# Patient Record
Sex: FEMALE | Race: WHITE | URBAN - METROPOLITAN AREA
[De-identification: names, ages, dates, MRNs, and addresses within clinical notes are randomized per-mention and may not be internally consistent; named-entity substitution may affect disease eponyms.]

---

## 2017-03-15 ENCOUNTER — TELEPHONE (OUTPATIENT)
Dept: INTERNAL MEDICINE | Facility: CLINIC | Age: 52
End: 2017-03-15

## 2017-10-01 ENCOUNTER — HEALTH MAINTENANCE LETTER (OUTPATIENT)
Age: 52
End: 2017-10-01

## 2017-10-18 ENCOUNTER — OFFICE VISIT (OUTPATIENT)
Dept: FAMILY MEDICINE | Facility: CLINIC | Age: 52
End: 2017-10-18
Payer: COMMERCIAL

## 2017-10-18 VITALS
TEMPERATURE: 98.8 F | HEART RATE: 67 BPM | DIASTOLIC BLOOD PRESSURE: 74 MMHG | OXYGEN SATURATION: 96 % | WEIGHT: 164.8 LBS | BODY MASS INDEX: 26.2 KG/M2 | SYSTOLIC BLOOD PRESSURE: 105 MMHG

## 2017-10-18 DIAGNOSIS — J01.90 ACUTE SINUSITIS WITH SYMPTOMS > 10 DAYS: Primary | ICD-10-CM

## 2017-10-18 DIAGNOSIS — R06.2 WHEEZING: ICD-10-CM

## 2017-10-18 PROCEDURE — 99213 OFFICE O/P EST LOW 20 MIN: CPT | Performed by: PHYSICIAN ASSISTANT

## 2017-10-18 RX ORDER — ALBUTEROL SULFATE 90 UG/1
2 AEROSOL, METERED RESPIRATORY (INHALATION) EVERY 4 HOURS PRN
Qty: 1 INHALER | Refills: 0 | Status: SHIPPED | OUTPATIENT
Start: 2017-10-18 | End: 2017-12-18

## 2017-10-18 NOTE — PROGRESS NOTES
SUBJECTIVE:   Hannah Dawson is a 51 year old female who presents to clinic today for the following health issues:      RESPIRATORY SYMPTOMS      Duration: 1 week    Description  nasal congestion, facial pain/pressure, cough, headache and hoarse voice    Severity: severe    Accompanying signs and symptoms: None    History (predisposing factors):  Had cold 1 month ago which lasted 10-14 days and this one started just as she was starting to get better,.    Precipitating or alleviating factors:     Ibuprofen helps    Therapies tried and outcome:  rest and fluids oral decongestant (mucinex D) advil, sinus rinse,     Works in [employer]    Does get wheezing with URI's sometimes but no formal asthma dx.              Allergies   Allergen Reactions     Erythromycin GI Disturbance       Past Medical History:   Diagnosis Date     Actinic keratosis      Menometrorrhagia      Other disorder of menstruation and other abnormal bleeding from female genital tract      Symptomatic menopausal or female climacteric states          Current Outpatient Prescriptions on File Prior to Visit:  DiphenhydrAMINE HCl (BENADRYL PO)    hydrocortisone (WESTCORT) 0.2 % cream Apply sparingly to affected area two times daily as needed.   albuterol (PROAIR HFA, PROVENTIL HFA, VENTOLIN HFA) 108 (90 BASE) MCG/ACT inhaler Inhale 2 puffs into the lungs every 6 hours as needed for shortness of breath / dyspnea or wheezing   NO ACTIVE MEDICATIONS      No current facility-administered medications on file prior to visit.     Social History   Substance Use Topics     Smoking status: Never Smoker     Smokeless tobacco: Never Used     Alcohol use Yes      Comment: occ       ROS:  Consitutional: As above  ENT: As above  Respiratory: As above    OBJECTIVE:  /74 (BP Location: Left arm, Cuff Size: Adult Regular)  Pulse 67  Temp 98.8  F (37.1  C)  Wt 164 lb 12.8 oz (74.8 kg)  LMP 12/14/2015  SpO2 96%  BMI 26.2 kg/m2  GENERAL APPEARANCE: healthy,  alert and no distress  EYES: conjunctiva clear  HENT:   TMs w/o erythema, effusion or bulging.  Nose and mouth without ulcers, erythema or lesions.  NO tonsillar enlargement erythema or exudates.   NECK: supple, nontender, no lymphadenopathy  RESP: lungs clear to auscultation - no rales, rhonchi or wheezes  CV: regular rates and rhythm, normal S1 S2, no murmur noted  NEURO: awake, alert          ASSESSMENT: Well appearing.    ICD-10-CM    1. Acute sinusitis with symptoms > 10 days J01.90 amoxicillin-clavulanate (AUGMENTIN) 875-125 MG per tablet   2. Wheezing R06.2 albuterol (PROAIR HFA/PROVENTIL HFA/VENTOLIN HFA) 108 (90 BASE) MCG/ACT Inhaler         PLAN:  Lots of rest and fluids.  RTC if any worsening symptoms or if not improving.    Zach Ch PA-C

## 2017-10-18 NOTE — MR AVS SNAPSHOT
After Visit Summary   10/18/2017    Hannah Dawson    MRN: [id number]           Patient Information     Date Of Birth          1965        Visit Information        Provider Department      10/18/2017 9:40 AM Huy Ch PA [facility]        Today's Diagnoses     Acute sinusitis with symptoms > 10 days    -  1    Wheezing          Care Instructions      Sinusitis [Abx Tx]    The sinuses are air-filled spaces within the bones of the face. They connect to the inside of the nose. Sinusitis is an inflammation of the tissue lining the sinus cavity. Sinus inflammation can occur during a cold or hay-fever (allergies to pollens and other particles in the air) and cause symptoms of sinus congestion and fullness. A sinus infection causes fever, headache and facial pain. There is usually green or yellow drainage from the nose or into the back of the throat (post-nasal drip). Antibiotics are prescribed to treat this condition.  Home Care:  Drink plenty of water, hot tea, and other liquids to stay well hydrated. This thins the mucus and promotes sinus drainage.  Apply heat to the painful areas of the face. Use a towel soaked in hot water. Or,  the shower and direct the hot spray onto your face. This is a good way to inhale warm water vapor and get heat on your face at the same time. (Cover your mouth and nose with your hands so you can still breathe as you do this.)  Use a vaporizer with products such as Vicks VapoRub (contains menthol) at night. Suck on peppermint, menthol or eucalyptus hard candies during the day.  An expectorant containing guaifenesin (such as Robitussin), helps to thin the mucus and promote drainage from the sinuses.  Over-the-counter decongestants may be used unless a similar medicine was prescribed. Nasal sprays work the fastest. Use one that contains phenylephrine (Cullen-synephrine, Sinex and others) or oxymetazoline (Afrin). First blow the  nose gently to remove mucus, then apply the drops. Do not use these medicines more often than directed on the label or for more than three days or symptoms may worsen. You may also use tablets containing pseudoephedrine (Sudafed). Many sinus remedies combine ingredients, which may increase side effects. Read the labels or ask the pharmacist for help. NOTE: Persons with high blood pressure should not use decongestants. They can raise blood pressure.  Antihistamines are useful if allergies are a cause of your sinusitis. The mildest one is chlorpheniramine (available without a prescription). The dose for adults is 8-12mg three times a day. [NOTE: Do not use chlorpheniramine if you have glaucoma or if you are a man with trouble urinating due to an enlarged prostate.] Claritin (loratidine) is an antihistamine that causes less drowsiness and is a good alternative for daytime use.  Do not use nasal rinses or irrigation during an acute sinus infection, unless advised by your doctor. Rinsing may spread the infection to other sinuses.  You may use acetaminophen (Tylenol) or ibuprofen (Motrin, Advil) to control pain, unless another pain medicine was prescribed. [ NOTE: If you have chronic liver or kidney disease or ever had a stomach ulcer, talk with your doctor before using these medicines.] (Aspirin should never be used in anyone under 18 years of age who is ill with a fever. It may cause severe liver damage.)  Finish the full course, even if you are feeling better after a few days.  Follow Up  with your doctor or this facility in one week or as instructed by our staff if not improving.  Get Prompt Medical Attention  if any of the following occur:  Facial pain or headache becomes more severe  Stiff neck  Unusual drowsiness or confusion, or not acting like your normal self  Swelling of the forehead or eyelids  Vision problems including blurred or double vision  Fever of 100.4 F (38 C) or higher, or as directed by your  healthcare provider  Seizure    [id number] [employer], [address] [place] [address], [place] [address]. All rights reserved. This information is not intended as a substitute for professional medical care. Always follow your healthcare professional's instructions.                Follow-ups after your visit        Who to contact     If you have questions or need follow up information about today's clinic visit or your schedule please contact [facility] directly at [phone].  Normal or non-critical lab and imaging results will be communicated to you by [employer], letter or phone within 4 business days after the clinic has received the results. If you do not hear from us within 7 days, please contact the clinic through [employer] or phone. If you have a critical or abnormal lab result, we will notify you by phone as soon as possible.  Submit refill requests through [employer] or call your pharmacy and they will forward the refill request to us. Please allow 3 business days for your refill to be completed.          Additional Information About Your Visit        [employer] Information     [employer] gives you secure access to your electronic health record. If you see a primary care provider, you can also send messages to your care team and make appointments. If you have questions, please call your primary care clinic.  If you do not have a primary care provider, please call [phone] and they will assist you.        Care EveryWhere ID     This is your Care EveryWhere ID. This could be used by other organizations to access your [facility] medical records  [id number]        Your Vitals Were     Pulse Temperature Last Period Pulse Oximetry BMI (Body Mass Index)       67 98.8  F (37.1  C) 12/14/2015 96% 26.2 kg/m2        Blood Pressure from Last 3 Encounters:   10/18/17 105/74   11/07/16 108/82   06/13/16 100/66    Weight from Last 3 Encounters:   10/18/17 164 lb 12.8 oz (74.8 kg)   11/07/16 161 lb (73 kg)    06/06/16 156 lb (70.8 kg)              Today, you had the following     No orders found for display         Today's Medication Changes          These changes are accurate as of: 10/18/17  9:49 AM.  If you have any questions, ask your nurse or doctor.               Start taking these medicines.        Dose/Directions    amoxicillin-clavulanate 875-125 MG per tablet   Commonly known as:  AUGMENTIN   Used for:  Acute sinusitis with symptoms > 10 days   Started by:  Huy Ch PA        Dose:  1 tablet   Take 1 tablet by mouth 2 times daily   Quantity:  20 tablet   Refills:  0         These medicines have changed or have updated prescriptions.        Dose/Directions    * albuterol 108 (90 BASE) MCG/ACT Inhaler   Commonly known as:  PROAIR HFA/PROVENTIL HFA/VENTOLIN HFA   This may have changed:  Another medication with the same name was added. Make sure you understand how and when to take each.   Used for:  Bronchospasm   Changed by:  Swathi Christopher APRN CNP        Dose:  2 puff   Inhale 2 puffs into the lungs every 6 hours as needed for shortness of breath / dyspnea or wheezing   Quantity:  1 Inhaler   Refills:  1       * albuterol 108 (90 BASE) MCG/ACT Inhaler   Commonly known as:  PROAIR HFA/PROVENTIL HFA/VENTOLIN HFA   This may have changed:  You were already taking a medication with the same name, and this prescription was added. Make sure you understand how and when to take each.   Used for:  Wheezing   Changed by:  Huy Ch PA        Dose:  2 puff   Inhale 2 puffs into the lungs every 4 hours as needed for shortness of breath / dyspnea or wheezing   Quantity:  1 Inhaler   Refills:  0       * Notice:  This list has 2 medication(s) that are the same as other medications prescribed for you. Read the directions carefully, and ask your doctor or other care provider to review them with you.         Where to get your medicines      These medications were sent to [facility] - [place], [place] - [address]  [address], [facility] [address]     Phone:  [phone]     albuterol 108 (90 BASE) MCG/ACT Inhaler    amoxicillin-clavulanate 875-125 MG per tablet                Primary Care Provider Office Phone # Fax #    [facility] [phone] [address] [facility] [address]        Equal Access to Services     ADA BELLA : Hadii aad ku hadasho Soomaali, waaxda luqadaha, qaybta kaalmada adeegyada, waxay idiin hayaan adeeg kharash lacolleen . [facility] [phone].    ATENCIÓN: Si habla español, tiene a ludwig disposición servicios gratuitos de asistencia lingüística. [facility] [phone].    We comply with applicable federal civil rights laws and [place] laws. We do not discriminate on the basis of race, color, national origin, age, disability, sex, sexual orientation, or gender identity.            Thank you!     Thank you for choosing [facility]  for your care. Our goal is always to provide you with excellent care. Hearing back from our patients is one way we can continue to improve our services. Please take a few minutes to complete the written survey that you may receive in the mail after your visit with us. Thank you!             Your Updated Medication List - Protect others around you: Learn how to safely use, store and throw away your medicines at www.disposemymeds.org.          This list is accurate as of: 10/18/17  9:49 AM.  Always use your most recent med list.                   Brand Name Dispense Instructions for use Diagnosis    ADVIL PO      Take 200 mg by mouth        * albuterol 108 (90 BASE) MCG/ACT Inhaler    PROAIR HFA/PROVENTIL HFA/VENTOLIN HFA    1 Inhaler    Inhale 2 puffs into the lungs every 6 hours as needed for shortness of breath / dyspnea or wheezing    Bronchospasm       * albuterol 108 (90 BASE) MCG/ACT Inhaler    PROAIR HFA/PROVENTIL HFA/VENTOLIN HFA    1 Inhaler     Inhale 2 puffs into the lungs every 4 hours as needed for shortness of breath / dyspnea or wheezing    Wheezing       amoxicillin-clavulanate 875-125 MG per tablet    AUGMENTIN    20 tablet    Take 1 tablet by mouth 2 times daily    Acute sinusitis with symptoms > 10 days       BENADRYL PO           hydrocortisone 0.2 % cream    WESTCORT    60 g    Apply sparingly to affected area two times daily as needed.    Eczema       NO ACTIVE MEDICATIONS           * Notice:  This list has 2 medication(s) that are the same as other medications prescribed for you. Read the directions carefully, and ask your doctor or other care provider to review them with you.

## 2017-10-18 NOTE — PATIENT INSTRUCTIONS
Sinusitis [Abx Tx]    The sinuses are air-filled spaces within the bones of the face. They connect to the inside of the nose. Sinusitis is an inflammation of the tissue lining the sinus cavity. Sinus inflammation can occur during a cold or hay-fever (allergies to pollens and other particles in the air) and cause symptoms of sinus congestion and fullness. A sinus infection causes fever, headache and facial pain. There is usually green or yellow drainage from the nose or into the back of the throat (post-nasal drip). Antibiotics are prescribed to treat this condition.  Home Care:  Drink plenty of water, hot tea, and other liquids to stay well hydrated. This thins the mucus and promotes sinus drainage.  Apply heat to the painful areas of the face. Use a towel soaked in hot water. Or,  the shower and direct the hot spray onto your face. This is a good way to inhale warm water vapor and get heat on your face at the same time. (Cover your mouth and nose with your hands so you can still breathe as you do this.)  Use a vaporizer with products such as Vicks VapoRub (contains menthol) at night. Suck on peppermint, menthol or eucalyptus hard candies during the day.  An expectorant containing guaifenesin (such as Robitussin), helps to thin the mucus and promote drainage from the sinuses.  Over-the-counter decongestants may be used unless a similar medicine was prescribed. Nasal sprays work the fastest. Use one that contains phenylephrine (Cullen-synephrine, Sinex and others) or oxymetazoline (Afrin). First blow the nose gently to remove mucus, then apply the drops. Do not use these medicines more often than directed on the label or for more than three days or symptoms may worsen. You may also use tablets containing pseudoephedrine (Sudafed). Many sinus remedies combine ingredients, which may increase side effects. Read the labels or ask the pharmacist for help. NOTE: Persons with high blood pressure should not use  decongestants. They can raise blood pressure.  Antihistamines are useful if allergies are a cause of your sinusitis. The mildest one is chlorpheniramine (available without a prescription). The dose for adults is 8-12mg three times a day. [NOTE: Do not use chlorpheniramine if you have glaucoma or if you are a man with trouble urinating due to an enlarged prostate.] Claritin (loratidine) is an antihistamine that causes less drowsiness and is a good alternative for daytime use.  Do not use nasal rinses or irrigation during an acute sinus infection, unless advised by your doctor. Rinsing may spread the infection to other sinuses.  You may use acetaminophen (Tylenol) or ibuprofen (Motrin, Advil) to control pain, unless another pain medicine was prescribed. [ NOTE: If you have chronic liver or kidney disease or ever had a stomach ulcer, talk with your doctor before using these medicines.] (Aspirin should never be used in anyone under 18 years of age who is ill with a fever. It may cause severe liver damage.)  Finish the full course, even if you are feeling better after a few days.  Follow Up  with your doctor or this facility in one week or as instructed by our staff if not improving.  Get Prompt Medical Attention  if any of the following occur:  Facial pain or headache becomes more severe  Stiff neck  Unusual drowsiness or confusion, or not acting like your normal self  Swelling of the forehead or eyelids  Vision problems including blurred or double vision  Fever of 100.4 F (38 C) or higher, or as directed by your healthcare provider  Seizure    [id number] [facility], [address] [place] [address], [place] [address]. All rights reserved. This information is not intended as a substitute for professional medical care. Always follow your healthcare professional's instructions.

## 2017-10-18 NOTE — Clinical Note
Please abstract the following data from this visit with this patient into the appropriate field in Epic:  Pap smear done on this date: 10/2016 (approximately), by this group: [facility], results were normal.

## 2017-10-18 NOTE — NURSING NOTE
"Chief Complaint   Patient presents with     URI       Initial /74 (BP Location: Left arm, Cuff Size: Adult Regular)  Pulse 67  Temp 98.8  F (37.1  C)  Wt 164 lb 12.8 oz (74.8 kg)  LMP 12/14/2015  SpO2 96%  BMI 26.2 kg/m2 Estimated body mass index is 26.2 kg/(m^2) as calculated from the following:    Height as of 11/7/16: 5' 6.5\" (1.689 m).    Weight as of this encounter: 164 lb 12.8 oz (74.8 kg).  Medication Reconciliation: complete   AZAM Mckinley 10/18/2017         "

## 2017-12-18 ENCOUNTER — OFFICE VISIT (OUTPATIENT)
Dept: URGENT CARE | Facility: URGENT CARE | Age: 52
End: 2017-12-18
Payer: COMMERCIAL

## 2017-12-18 VITALS
TEMPERATURE: 97.7 F | BODY MASS INDEX: 25.91 KG/M2 | WEIGHT: 163 LBS | DIASTOLIC BLOOD PRESSURE: 78 MMHG | HEART RATE: 70 BPM | SYSTOLIC BLOOD PRESSURE: 123 MMHG | OXYGEN SATURATION: 97 %

## 2017-12-18 DIAGNOSIS — J02.9 SORETHROAT: Primary | ICD-10-CM

## 2017-12-18 DIAGNOSIS — J02.0 STREP THROAT: ICD-10-CM

## 2017-12-18 LAB
DEPRECATED S PYO AG THROAT QL EIA: ABNORMAL
SPECIMEN SOURCE: ABNORMAL

## 2017-12-18 PROCEDURE — 99213 OFFICE O/P EST LOW 20 MIN: CPT | Performed by: PHYSICIAN ASSISTANT

## 2017-12-18 PROCEDURE — 87880 STREP A ASSAY W/OPTIC: CPT | Performed by: PHYSICIAN ASSISTANT

## 2017-12-18 RX ORDER — AMOXICILLIN 500 MG/1
500 TABLET, FILM COATED ORAL 2 TIMES DAILY
Qty: 20 TABLET | Refills: 0 | Status: SHIPPED | OUTPATIENT
Start: 2017-12-18 | End: 2018-06-14

## 2017-12-18 ASSESSMENT — ENCOUNTER SYMPTOMS
CHILLS: 0
SORE THROAT: 1
VOMITING: 0
NAUSEA: 0
COUGH: 0
FEVER: 0

## 2017-12-18 NOTE — MR AVS SNAPSHOT
After Visit Summary   12/18/2017    Hannah Dawson    MRN: [id number]           Patient Information     Date Of Birth          1965        Visit Information        Provider Department      12/18/2017 5:45 PM Ninfa Fry PA-C [facility]        Today's Diagnoses     Sorethroat    -  1    Strep throat          Care Instructions      Pharyngitis: Strep (Confirmed)    You have had a positive test for strep throat. Strep throat is a contagious illness. It is spread by coughing, kissing or by touching others after touching your mouth or nose. Symptoms include throat pain that is worse with swallowing, aching all over, headache, and fever. It is treated with antibiotic medicine. This should help you start to feel better in 1 to 2 days.  Home care    Rest at home. Drink plenty of fluids to you won't get dehydrated.    No work or school for the first 2 days of taking the antibiotics. After this time, you will not be contagious. You can then return to school or work if you are feeling better.     Take antibiotic medicine for the full 10 days, even if you feel better. This is very important to ensure the infection is treated. It is also important to prevent medicine-resistant germs from developing. If you were given an antibiotic shot, you don't need any more antibiotics.    You may use acetaminophen or ibuprofen to control pain or fever, unless another medicine was prescribed for this. Talk with your doctor before taking these medicines if you have chronic liver or kidney disease. Also talk with your doctor if you have had a stomach ulcer or GI bleeding.    Throat lozenges or sprays help reduce pain. Gargling with warm saltwater will also reduce throat pain. Dissolve 1/2 teaspoon of salt in 1 glass of warm water. This may be useful just before meals.     Soft foods are OK. Avoid salty or spicy foods.  Follow-up care  Follow up with your healthcare provider or our staff if you  don't get better over the next week.  When to seek medical advice  Call your healthcare provider right away if any of these occur:    Fever of 100.4 F (38 C) or higher, or as directed by your healthcare provider    New or worsening ear pain, sinus pain, or headache    Painful lumps in the back of neck    Stiff neck    Lymph nodes getting larger or becoming soft in the middle    You can't swallow liquids or you can't open your mouth wide because of throat pain    Signs of dehydration. These include very dark urine or no urine, sunken eyes, and dizziness.    Trouble breathing or noisy breathing    Muffled voice    Rash  Date Last Reviewed: 4/13/2015 2000-2017 [employer]. [address] [place] [address]. All rights reserved. This information is not intended as a substitute for professional medical care. Always follow your healthcare professional's instructions.                Follow-ups after your visit        Your next 10 appointments already scheduled     Dec 19, 2017  2:00 PM CST   SHORT with Janette Paulino MD   [facility] ([facility]    [address] [facility] [address] [phone]              Who to contact     If you have questions or need follow up information about today's clinic visit or your schedule please contact [facility] directly at [phone].  Normal or non-critical lab and imaging results will be communicated to you by MyChart, letter or phone within 4 business days after the clinic has received the results. If you do not hear from us within 7 days, please contact the clinic through MyChart or phone. If you have a critical or abnormal lab result, we will notify you by phone as soon as possible.  Submit refill requests through [employer] or call your pharmacy and they will forward the refill request to us. Please allow 3 business days for your refill to be completed.          Additional Information About Your Visit         Ludi Information     Ludi gives you secure access to your electronic health record. If you see a primary care provider, you can also send messages to your care team and make appointments. If you have questions, please call your primary care clinic.  If you do not have a primary care provider, please call [phone] and they will assist you.        Care EveryWhere ID     This is your Care EveryWhere ID. This could be used by other organizations to access your [place] medical records  [id number]        Your Vitals Were     Pulse Temperature Last Period Pulse Oximetry BMI (Body Mass Index)       70 97.7  F (36.5  C) (Oral) 12/14/2015 97% 25.91 kg/m2        Blood Pressure from Last 3 Encounters:   12/18/17 123/78   10/18/17 105/74   11/07/16 108/82    Weight from Last 3 Encounters:   12/18/17 163 lb (73.9 kg)   10/18/17 164 lb 12.8 oz (74.8 kg)   11/07/16 161 lb (73 kg)              We Performed the Following     Strep, Rapid Screen          Today's Medication Changes          These changes are accurate as of: 12/18/17  7:44 PM.  If you have any questions, ask your nurse or doctor.               Start taking these medicines.        Dose/Directions    amoxicillin 500 MG tablet   Commonly known as:  AMOXIL   Used for:  Strep throat        Dose:  500 mg   Take 1 tablet (500 mg) by mouth 2 times daily   Quantity:  20 tablet   Refills:  0         Stop taking these medicines if you haven't already. Please contact your care team if you have questions.     albuterol 108 (90 BASE) MCG/ACT Inhaler   Commonly known as:  PROAIR HFA/PROVENTIL HFA/VENTOLIN HFA           amoxicillin-clavulanate 875-125 MG per tablet   Commonly known as:  AUGMENTIN           hydrocortisone 0.2 % cream   Commonly known as:  WESTCORT                Where to get your medicines      These medications were sent to [facility] [id number] [place] [address] [address] [facility] [address] [facility]  [address] [facility],  [facility] [address]     Phone:  [phone]     amoxicillin 500 MG tablet                Primary Care Provider Office Phone # Fax #    [facility] [phone] [address] [facility] [address]        Equal Access to Services     ADA BELLA : Hadii aad ku hadasho Soomaali, waaxda luqadaha, qaybta kaalmada adeegyada, waxay brittnyin hayaan [place] velazquez lacolleen dsouza. So [facility] [phone].    ATENCIÓN: Si habla español, tiene a ludwig disposición servicios gratuitos de asistencia lingüística. Llame al [phone].    We comply with applicable federal civil rights laws and [place] laws. We do not discriminate on the basis of race, color, national origin, age, disability, sex, sexual orientation, or gender identity.            Thank you!     Thank you for choosing [facility]  for your care. Our goal is always to provide you with excellent care. Hearing back from our patients is one way we can continue to improve our services. Please take a few minutes to complete the written survey that you may receive in the mail after your visit with us. Thank you!             Your Updated Medication List - Protect others around you: Learn how to safely use, store and throw away your medicines at www.disposemymeds.org.          This list is accurate as of: 12/18/17  7:44 PM.  Always use your most recent med list.                   Brand Name Dispense Instructions for use Diagnosis    ADVIL PO      Take 200 mg by mouth        amoxicillin 500 MG tablet    AMOXIL    20 tablet    Take 1 tablet (500 mg) by mouth 2 times daily    Strep throat       BENADRYL PO           NO ACTIVE MEDICATIONS

## 2017-12-19 NOTE — PROGRESS NOTES
SUBJECTIVE:   Hannah Dawson is a 52 year old female presenting with a chief complaint of   Chief Complaint   Patient presents with     Pharyngitis     would like to check for strep   .  Sore throat x 4-5 days. No cough or other cold symptoms. NO fevers.   Works at an elementary school.     Review of Systems   Constitutional: Negative for chills and fever.   HENT: Positive for sore throat. Negative for ear pain.    Respiratory: Negative for cough.    Gastrointestinal: Negative for nausea and vomiting.   Skin: Negative for rash.     Past Medical History:   Diagnosis Date     Actinic keratosis      Menometrorrhagia      Other disorder of menstruation and other abnormal bleeding from female genital tract      Symptomatic menopausal or female climacteric states      Current Outpatient Prescriptions   Medication Sig Dispense Refill     Ibuprofen (ADVIL PO) Take 200 mg by mouth       DiphenhydrAMINE HCl (BENADRYL PO)        NO ACTIVE MEDICATIONS        Social History   Substance Use Topics     Smoking status: Never Smoker     Smokeless tobacco: Never Used     Alcohol use Yes      Comment: occ       OBJECTIVE  /78  Pulse 70  Temp 97.7  F (36.5  C) (Oral)  Wt 163 lb (73.9 kg)  LMP 12/14/2015  SpO2 97%  BMI 25.91 kg/m2    Physical Exam    Labs:  No results found for this or any previous visit (from the past 24 hour(s)).        ASSESSMENT:      ICD-10-CM    1. Sorethroat J02.9 Strep, Rapid Screen        Medical Decision Making:    Differential Diagnosis:  Strep throat    Serious Comorbid Conditions:  Adult:  None    PLAN:    + strep throat    Current Outpatient Prescriptions   Medication     amoxicillin (AMOXIL) 500 MG tablet     Ibuprofen (ADVIL PO)     DiphenhydrAMINE HCl (BENADRYL PO)     NO ACTIVE MEDICATIONS     No current facility-administered medications for this visit.      Amoxicillin 500mg BID for 10 days    Followup:    If not improving or if condition worsens, follow up with your Primary Care  Provider    Patient Instructions     Pharyngitis: Strep (Confirmed)    You have had a positive test for strep throat. Strep throat is a contagious illness. It is spread by coughing, kissing or by touching others after touching your mouth or nose. Symptoms include throat pain that is worse with swallowing, aching all over, headache, and fever. It is treated with antibiotic medicine. This should help you start to feel better in 1 to 2 days.  Home care    Rest at home. Drink plenty of fluids to you won't get dehydrated.    No work or school for the first 2 days of taking the antibiotics. After this time, you will not be contagious. You can then return to school or work if you are feeling better.     Take antibiotic medicine for the full 10 days, even if you feel better. This is very important to ensure the infection is treated. It is also important to prevent medicine-resistant germs from developing. If you were given an antibiotic shot, you don't need any more antibiotics.    You may use acetaminophen or ibuprofen to control pain or fever, unless another medicine was prescribed for this. Talk with your doctor before taking these medicines if you have chronic liver or kidney disease. Also talk with your doctor if you have had a stomach ulcer or GI bleeding.    Throat lozenges or sprays help reduce pain. Gargling with warm saltwater will also reduce throat pain. Dissolve 1/2 teaspoon of salt in 1 glass of warm water. This may be useful just before meals.     Soft foods are OK. Avoid salty or spicy foods.  Follow-up care  Follow up with your healthcare provider or our staff if you don't get better over the next week.  When to seek medical advice  Call your healthcare provider right away if any of these occur:    Fever of 100.4 F (38 C) or higher, or as directed by your healthcare provider    New or worsening ear pain, sinus pain, or headache    Painful lumps in the back of neck    Stiff neck    Lymph nodes getting larger  or becoming soft in the middle    You can't swallow liquids or you can't open your mouth wide because of throat pain    Signs of dehydration. These include very dark urine or no urine, sunken eyes, and dizziness.    Trouble breathing or noisy breathing    Muffled voice    Rash  Date Last Reviewed: 4/13/2015 2000-2017 [employer]. [address] [place] [address], [place] [address]. All rights reserved. This information is not intended as a substitute for professional medical care. Always follow your healthcare professional's instructions.

## 2017-12-19 NOTE — PATIENT INSTRUCTIONS
Pharyngitis: Strep (Confirmed)    You have had a positive test for strep throat. Strep throat is a contagious illness. It is spread by coughing, kissing or by touching others after touching your mouth or nose. Symptoms include throat pain that is worse with swallowing, aching all over, headache, and fever. It is treated with antibiotic medicine. This should help you start to feel better in 1 to 2 days.  Home care    Rest at home. Drink plenty of fluids to you won't get dehydrated.    No work or school for the first 2 days of taking the antibiotics. After this time, you will not be contagious. You can then return to school or work if you are feeling better.     Take antibiotic medicine for the full 10 days, even if you feel better. This is very important to ensure the infection is treated. It is also important to prevent medicine-resistant germs from developing. If you were given an antibiotic shot, you don't need any more antibiotics.    You may use acetaminophen or ibuprofen to control pain or fever, unless another medicine was prescribed for this. Talk with your doctor before taking these medicines if you have chronic liver or kidney disease. Also talk with your doctor if you have had a stomach ulcer or GI bleeding.    Throat lozenges or sprays help reduce pain. Gargling with warm saltwater will also reduce throat pain. Dissolve 1/2 teaspoon of salt in 1 glass of warm water. This may be useful just before meals.     Soft foods are OK. Avoid salty or spicy foods.  Follow-up care  Follow up with your healthcare provider or our staff if you don't get better over the next week.  When to seek medical advice  Call your healthcare provider right away if any of these occur:    Fever of 100.4 F (38 C) or higher, or as directed by your healthcare provider    New or worsening ear pain, sinus pain, or headache    Painful lumps in the back of neck    Stiff neck    Lymph nodes getting larger or becoming soft in the  middle    You can't swallow liquids or you can't open your mouth wide because of throat pain    Signs of dehydration. These include very dark urine or no urine, sunken eyes, and dizziness.    Trouble breathing or noisy breathing    Muffled voice    Rash  Date Last Reviewed: 4/13/2015 2000-2017 [employer]. [address] [place] [address], [place] [address]. All rights reserved. This information is not intended as a substitute for professional medical care. Always follow your healthcare professional's instructions.

## 2017-12-19 NOTE — NURSING NOTE
"Chief Complaint   Patient presents with     Pharyngitis     would like to check for strep       Initial /78  Pulse 70  Temp 97.7  F (36.5  C) (Oral)  Wt 163 lb (73.9 kg)  LMP 12/14/2015  SpO2 97%  BMI 25.91 kg/m2 Estimated body mass index is 25.91 kg/(m^2) as calculated from the following:    Height as of 11/7/16: 5' 6.5\" (1.689 m).    Weight as of this encounter: 163 lb (73.9 kg).  Medication Reconciliation: complete     VLADIMIR CABRERA      "

## 2018-06-14 ENCOUNTER — OFFICE VISIT (OUTPATIENT)
Dept: FAMILY MEDICINE | Facility: CLINIC | Age: 53
End: 2018-06-14
Payer: COMMERCIAL

## 2018-06-14 VITALS
HEART RATE: 71 BPM | DIASTOLIC BLOOD PRESSURE: 78 MMHG | RESPIRATION RATE: 18 BRPM | BODY MASS INDEX: 26.23 KG/M2 | OXYGEN SATURATION: 98 % | WEIGHT: 165 LBS | TEMPERATURE: 97.7 F | SYSTOLIC BLOOD PRESSURE: 117 MMHG

## 2018-06-14 DIAGNOSIS — R07.0 THROAT PAIN: ICD-10-CM

## 2018-06-14 DIAGNOSIS — J02.9 VIRAL PHARYNGITIS: Primary | ICD-10-CM

## 2018-06-14 LAB
DEPRECATED S PYO AG THROAT QL EIA: NORMAL
SPECIMEN SOURCE: NORMAL

## 2018-06-14 PROCEDURE — 99213 OFFICE O/P EST LOW 20 MIN: CPT | Performed by: FAMILY MEDICINE

## 2018-06-14 PROCEDURE — 87880 STREP A ASSAY W/OPTIC: CPT | Performed by: FAMILY MEDICINE

## 2018-06-14 PROCEDURE — 87081 CULTURE SCREEN ONLY: CPT | Performed by: FAMILY MEDICINE

## 2018-06-14 ASSESSMENT — PAIN SCALES - GENERAL: PAINLEVEL: NO PAIN (0)

## 2018-06-14 NOTE — NURSING NOTE
"Chief Complaint   Patient presents with     Pharyngitis     possible strep x 3 days       Initial /78  Pulse 71  Temp 97.7  F (36.5  C) (Oral)  Resp 18  Wt 165 lb (74.8 kg)  LMP 12/14/2015  SpO2 98%  BMI 26.23 kg/m2 Estimated body mass index is 26.23 kg/(m^2) as calculated from the following:    Height as of 11/7/16: 5' 6.5\" (1.689 m).    Weight as of this encounter: 165 lb (74.8 kg).  Medication Reconciliation: complete    ANITA Rogers MA    "

## 2018-06-14 NOTE — PROGRESS NOTES
CHIEF COMPLAINT    Sore throat      HISTORY    ST for 2-3 days. Slight malaise. No congestion.    Patient Active Problem List   Diagnosis     CARDIOVASCULAR SCREENING; LDL GOAL LESS THAN 160     Current Outpatient Prescriptions   Medication Sig Dispense Refill     DiphenhydrAMINE HCl (BENADRYL PO)        Ibuprofen (ADVIL PO) Take 200 mg by mouth         REVIEW OF SYSTEMS    No cough or SOB  No CP  No GI upset  No rash      Past Medical History:   Diagnosis Date     Actinic keratosis      Menometrorrhagia      Other disorder of menstruation and other abnormal bleeding from female genital tract      Symptomatic menopausal or female climacteric states        EXAM  /78  Pulse 71  Temp 97.7  F (36.5  C) (Oral)  Resp 18  Wt 165 lb (74.8 kg)  LMP 12/14/2015  SpO2 98%  BMI 26.23 kg/m2    Pharynx mildly red with small tonsils, no exudate  Mildly enlarged ant cervical nodes  Skin no rash    Results for orders placed or performed in visit on 06/14/18   Strep, Rapid Screen   Result Value Ref Range    Specimen Description Throat     Rapid Strep A Screen       NEGATIVE: No Group A streptococcal antigen detected by immunoassay, await culture report.       (J02.9) Viral pharyngitis  (primary encounter diagnosis)  Comment:   Plan: symptomatic    (R07.0) Throat pain  Comment:   Plan: Strep, Rapid Screen, Beta strep group A culture

## 2018-06-14 NOTE — MR AVS SNAPSHOT
After Visit Summary   6/14/2018    Hannah Dawson    MRN: [id number]           Patient Information     Date Of Birth          1965        Visit Information        Provider Department      6/14/2018 11:00 AM Dixon Young MD [facility]        Today's Diagnoses     Viral pharyngitis    -  1    Throat pain           Follow-ups after your visit        Who to contact     If you have questions or need follow up information about today's clinic visit or your schedule please contact [facility] directly at [phone].  Normal or non-critical lab and imaging results will be communicated to you by [employer], letter or phone within 4 business days after the clinic has received the results. If you do not hear from us within 7 days, please contact the clinic through [employer] or phone. If you have a critical or abnormal lab result, we will notify you by phone as soon as possible.  Submit refill requests through [employer] or call your pharmacy and they will forward the refill request to us. Please allow 3 business days for your refill to be completed.          Additional Information About Your Visit        MyChart Information     [employer] gives you secure access to your electronic health record. If you see a primary care provider, you can also send messages to your care team and make appointments. If you have questions, please call your primary care clinic.  If you do not have a primary care provider, please call [phone] and they will assist you.        Care EveryWhere ID     This is your Care EveryWhere ID. This could be used by other organizations to access your [place] medical records  [id number]        Your Vitals Were     Pulse Temperature Respirations Last Period Pulse Oximetry BMI (Body Mass Index)    71 97.7  F (36.5  C) (Oral) 18 12/14/2015 98% 26.23 kg/m2       Blood Pressure from Last 3 Encounters:   06/14/18 117/78   12/18/17 123/78   10/18/17 105/74    Weight  from Last 3 Encounters:   06/14/18 165 lb (74.8 kg)   12/18/17 163 lb (73.9 kg)   10/18/17 164 lb 12.8 oz (74.8 kg)              We Performed the Following     Beta strep group A culture     Strep, Rapid Screen        Primary Care Provider Office Phone # Fax #    [facility] [phone] [address] [facility] [address]        Equal Access to Services     ADA BELLA : Hadii aad ku hadasho Soomaali, waaxda luqadaha, qaybta kaalmada adeegyada, waxay idiin hayaan adeeg nikhilaragadiel lacolleen . So [facility] [phone].    ATENCIÓN: Si habla español, tiene a ludwig disposición servicios gratuitos de asistencia lingüística. Ll[facility] [phone].    We comply with applicable federal civil rights laws and [place] laws. We do not discriminate on the basis of race, color, national origin, age, disability, sex, sexual orientation, or gender identity.            Thank you!     Thank you for choosing [facility]  for your care. Our goal is always to provide you with excellent care. Hearing back from our patients is one way we can continue to improve our services. Please take a few minutes to complete the written survey that you may receive in the mail after your visit with us. Thank you!             Your Updated Medication List - Protect others around you: Learn how to safely use, store and throw away your medicines at www.disposemymeds.org.          This list is accurate as of 6/14/18 11:44 AM.  Always use your most recent med list.                   Brand Name Dispense Instructions for use Diagnosis    ADVIL PO      Take 200 mg by mouth        BENADRYL PO

## 2018-06-15 LAB
BACTERIA SPEC CULT: NORMAL
SPECIMEN SOURCE: NORMAL

## 2018-11-01 ENCOUNTER — TRANSFERRED RECORDS (OUTPATIENT)
Dept: HEALTH INFORMATION MANAGEMENT | Facility: CLINIC | Age: 53
End: 2018-11-01

## 2019-04-24 ENCOUNTER — TELEPHONE (OUTPATIENT)
Dept: FAMILY MEDICINE | Facility: CLINIC | Age: 54
End: 2019-04-24

## 2019-04-24 NOTE — TELEPHONE ENCOUNTER
4/24/2019        Patient return call and states that she already completed a Mammogram through [facility] [facility].           Outreach ,  Dorita Lawrence

## 2019-04-24 NOTE — TELEPHONE ENCOUNTER
4/24/2019    Attempt 1    Contacted patient in regards to scheduling VIP mammogram, MAY 14 for [facility]K.    Message on voicemail     Patient is also due for -    Comments:       Outreach   AT

## 2020-02-23 ENCOUNTER — HEALTH MAINTENANCE LETTER (OUTPATIENT)
Age: 55
End: 2020-02-23